# Patient Record
Sex: FEMALE | Race: WHITE | Employment: OTHER | URBAN - METROPOLITAN AREA
[De-identification: names, ages, dates, MRNs, and addresses within clinical notes are randomized per-mention and may not be internally consistent; named-entity substitution may affect disease eponyms.]

---

## 2017-01-01 ENCOUNTER — CARE COORDINATION (OUTPATIENT)
Dept: CARE COORDINATION | Age: 57
End: 2017-01-01

## 2017-01-01 ENCOUNTER — CARE COORDINATION (OUTPATIENT)
Dept: CASE MANAGEMENT | Age: 57
End: 2017-01-01

## 2017-01-01 ENCOUNTER — OFFICE VISIT (OUTPATIENT)
Dept: CARDIOTHORACIC SURGERY | Age: 57
End: 2017-01-01

## 2017-01-01 ENCOUNTER — HOSPITAL ENCOUNTER (OUTPATIENT)
Dept: PREADMISSION TESTING | Age: 57
Discharge: HOME | End: 2017-05-01
Attending: OBSTETRICS & GYNECOLOGY | Admitting: OBSTETRICS & GYNECOLOGY

## 2017-01-01 ENCOUNTER — OFFICE VISIT (OUTPATIENT)
Dept: CARDIOLOGY CLINIC | Age: 57
End: 2017-01-01

## 2017-01-01 ENCOUNTER — PAT TELEPHONE (OUTPATIENT)
Dept: PREADMISSION TESTING | Age: 57
End: 2017-01-01

## 2017-01-01 ENCOUNTER — HOSPITAL ENCOUNTER (OUTPATIENT)
Dept: ENDOSCOPY | Age: 57
Discharge: HOME | End: 2017-03-13
Attending: INTERNAL MEDICINE | Admitting: INTERNAL MEDICINE

## 2017-01-01 ENCOUNTER — TELEPHONE (OUTPATIENT)
Dept: PHARMACY | Facility: CLINIC | Age: 57
End: 2017-01-01

## 2017-01-01 ENCOUNTER — TELEPHONE (OUTPATIENT)
Dept: INFECTIOUS DISEASES | Age: 57
End: 2017-01-01

## 2017-01-01 ENCOUNTER — HOSPITAL ENCOUNTER (OUTPATIENT)
Dept: PREADMISSION TESTING | Age: 57
Discharge: HOME | End: 2017-10-18
Attending: THORACIC SURGERY (CARDIOTHORACIC VASCULAR SURGERY) | Admitting: THORACIC SURGERY (CARDIOTHORACIC VASCULAR SURGERY)

## 2017-01-01 ENCOUNTER — OFFICE VISIT (OUTPATIENT)
Dept: INFECTIOUS DISEASES | Age: 57
End: 2017-01-01

## 2017-01-01 ENCOUNTER — TELEPHONE (OUTPATIENT)
Dept: CARDIAC CATH/INVASIVE PROCEDURES | Age: 57
End: 2017-01-01

## 2017-01-01 ENCOUNTER — TELEPHONE (OUTPATIENT)
Dept: CARDIOLOGY CLINIC | Age: 57
End: 2017-01-01

## 2017-01-01 ENCOUNTER — TELEPHONE (OUTPATIENT)
Dept: CARDIOTHORACIC SURGERY | Age: 57
End: 2017-01-01

## 2017-01-01 ENCOUNTER — SURG/PROC ORDERS (OUTPATIENT)
Dept: GYNECOLOGIC ONCOLOGY | Age: 57
End: 2017-01-01

## 2017-01-01 ENCOUNTER — HOSPITAL ENCOUNTER (OUTPATIENT)
Dept: SURGERY | Age: 57
Discharge: HOME | End: 2017-05-04
Attending: OBSTETRICS & GYNECOLOGY | Admitting: OBSTETRICS & GYNECOLOGY

## 2017-01-01 ENCOUNTER — OFFICE VISIT (OUTPATIENT)
Dept: FAMILY MEDICINE CLINIC | Age: 57
End: 2017-01-01

## 2017-01-01 VITALS
BODY MASS INDEX: 19.57 KG/M2 | DIASTOLIC BLOOD PRESSURE: 78 MMHG | WEIGHT: 107 LBS | SYSTOLIC BLOOD PRESSURE: 130 MMHG | HEART RATE: 60 BPM

## 2017-01-01 VITALS
HEART RATE: 75 BPM | SYSTOLIC BLOOD PRESSURE: 122 MMHG | BODY MASS INDEX: 17.66 KG/M2 | HEIGHT: 62 IN | DIASTOLIC BLOOD PRESSURE: 65 MMHG | TEMPERATURE: 96.6 F | RESPIRATION RATE: 14 BRPM | OXYGEN SATURATION: 93 % | WEIGHT: 96 LBS

## 2017-01-01 VITALS
WEIGHT: 109 LBS | BODY MASS INDEX: 19.94 KG/M2 | SYSTOLIC BLOOD PRESSURE: 134 MMHG | DIASTOLIC BLOOD PRESSURE: 80 MMHG | TEMPERATURE: 98 F

## 2017-01-01 VITALS
TEMPERATURE: 97.9 F | BODY MASS INDEX: 20.12 KG/M2 | DIASTOLIC BLOOD PRESSURE: 70 MMHG | SYSTOLIC BLOOD PRESSURE: 126 MMHG | WEIGHT: 110 LBS

## 2017-01-01 VITALS
WEIGHT: 101 LBS | DIASTOLIC BLOOD PRESSURE: 60 MMHG | SYSTOLIC BLOOD PRESSURE: 108 MMHG | TEMPERATURE: 98.2 F | BODY MASS INDEX: 18.47 KG/M2

## 2017-01-01 VITALS
HEIGHT: 62 IN | SYSTOLIC BLOOD PRESSURE: 128 MMHG | TEMPERATURE: 97.8 F | BODY MASS INDEX: 19.88 KG/M2 | SYSTOLIC BLOOD PRESSURE: 136 MMHG | DIASTOLIC BLOOD PRESSURE: 60 MMHG | TEMPERATURE: 98 F | WEIGHT: 108 LBS | BODY MASS INDEX: 20.78 KG/M2 | WEIGHT: 110 LBS | DIASTOLIC BLOOD PRESSURE: 78 MMHG

## 2017-01-01 VITALS
DIASTOLIC BLOOD PRESSURE: 62 MMHG | SYSTOLIC BLOOD PRESSURE: 118 MMHG | WEIGHT: 104 LBS | BODY MASS INDEX: 19.14 KG/M2 | OXYGEN SATURATION: 93 % | TEMPERATURE: 98.3 F | HEART RATE: 95 BPM | HEIGHT: 62 IN

## 2017-01-01 VITALS
WEIGHT: 99 LBS | HEART RATE: 81 BPM | BODY MASS INDEX: 18.22 KG/M2 | HEIGHT: 62 IN | OXYGEN SATURATION: 92 % | SYSTOLIC BLOOD PRESSURE: 128 MMHG | DIASTOLIC BLOOD PRESSURE: 76 MMHG

## 2017-01-01 VITALS
DIASTOLIC BLOOD PRESSURE: 60 MMHG | OXYGEN SATURATION: 94 % | RESPIRATION RATE: 16 BRPM | HEART RATE: 75 BPM | TEMPERATURE: 97.7 F | BODY MASS INDEX: 20.06 KG/M2 | HEIGHT: 62 IN | WEIGHT: 109 LBS | SYSTOLIC BLOOD PRESSURE: 127 MMHG

## 2017-01-01 VITALS
SYSTOLIC BLOOD PRESSURE: 110 MMHG | BODY MASS INDEX: 21.5 KG/M2 | WEIGHT: 113.8 LBS | DIASTOLIC BLOOD PRESSURE: 60 MMHG | HEART RATE: 72 BPM

## 2017-01-01 VITALS
WEIGHT: 98.4 LBS | SYSTOLIC BLOOD PRESSURE: 104 MMHG | BODY MASS INDEX: 18 KG/M2 | HEART RATE: 80 BPM | DIASTOLIC BLOOD PRESSURE: 56 MMHG

## 2017-01-01 VITALS
SYSTOLIC BLOOD PRESSURE: 137 MMHG | BODY MASS INDEX: 20.14 KG/M2 | TEMPERATURE: 96.9 F | OXYGEN SATURATION: 94 % | HEART RATE: 75 BPM | WEIGHT: 109.46 LBS | DIASTOLIC BLOOD PRESSURE: 82 MMHG | HEIGHT: 62 IN | RESPIRATION RATE: 16 BRPM

## 2017-01-01 VITALS
HEART RATE: 76 BPM | WEIGHT: 107 LBS | SYSTOLIC BLOOD PRESSURE: 118 MMHG | DIASTOLIC BLOOD PRESSURE: 68 MMHG | RESPIRATION RATE: 16 BRPM | BODY MASS INDEX: 19.69 KG/M2 | HEIGHT: 62 IN

## 2017-01-01 VITALS — HEIGHT: 62 IN | BODY MASS INDEX: 21.53 KG/M2 | WEIGHT: 117 LBS

## 2017-01-01 VITALS
HEART RATE: 76 BPM | OXYGEN SATURATION: 90 % | BODY MASS INDEX: 19.98 KG/M2 | HEIGHT: 62 IN | TEMPERATURE: 98 F | RESPIRATION RATE: 18 BRPM | DIASTOLIC BLOOD PRESSURE: 68 MMHG | SYSTOLIC BLOOD PRESSURE: 112 MMHG | WEIGHT: 108.6 LBS

## 2017-01-01 VITALS
SYSTOLIC BLOOD PRESSURE: 142 MMHG | HEART RATE: 87 BPM | BODY MASS INDEX: 19.57 KG/M2 | OXYGEN SATURATION: 91 % | DIASTOLIC BLOOD PRESSURE: 72 MMHG | WEIGHT: 107 LBS | TEMPERATURE: 97.7 F

## 2017-01-01 DIAGNOSIS — R68.89 OTHER GENERAL SYMPTOMS AND SIGNS: ICD-10-CM

## 2017-01-01 DIAGNOSIS — Z01.818 PREOP TESTING: ICD-10-CM

## 2017-01-01 DIAGNOSIS — B20 HUMAN IMMUNODEFICIENCY VIRUS (HIV) DISEASE (HCC): Primary | ICD-10-CM

## 2017-01-01 DIAGNOSIS — Z00.00 MEDICARE ANNUAL WELLNESS VISIT, SUBSEQUENT: Primary | ICD-10-CM

## 2017-01-01 DIAGNOSIS — I47.1 SVT (SUPRAVENTRICULAR TACHYCARDIA) (HCC): ICD-10-CM

## 2017-01-01 DIAGNOSIS — Q24.9 COMPLEX CONGENITAL HEART DEFECT: ICD-10-CM

## 2017-01-01 DIAGNOSIS — I25.10 CAD S/P PERCUTANEOUS CORONARY ANGIOPLASTY: ICD-10-CM

## 2017-01-01 DIAGNOSIS — I10 ESSENTIAL HYPERTENSION, BENIGN: Primary | ICD-10-CM

## 2017-01-01 DIAGNOSIS — R68.3 CLUBBING OF FINGERS: ICD-10-CM

## 2017-01-01 DIAGNOSIS — E46 MALNUTRITION (HCC): ICD-10-CM

## 2017-01-01 DIAGNOSIS — I10 ESSENTIAL HYPERTENSION, BENIGN: ICD-10-CM

## 2017-01-01 DIAGNOSIS — I47.1 SVT (SUPRAVENTRICULAR TACHYCARDIA) (HCC): Primary | ICD-10-CM

## 2017-01-01 DIAGNOSIS — N18.30 CHRONIC KIDNEY DISEASE (CKD), STAGE III (MODERATE) (HCC): ICD-10-CM

## 2017-01-01 DIAGNOSIS — N18.9 CKD (CHRONIC KIDNEY DISEASE), UNSPECIFIED STAGE: ICD-10-CM

## 2017-01-01 DIAGNOSIS — Q20.3 CONGENITALLY CORRECTED TRANSPOSITION OF THE GREAT ARTERIES WITH VENTRICULAR SEPTAL DEFECT: Primary | ICD-10-CM

## 2017-01-01 DIAGNOSIS — I50.42 CHF (CONGESTIVE HEART FAILURE), NYHA CLASS II, CHRONIC, COMBINED (HCC): ICD-10-CM

## 2017-01-01 DIAGNOSIS — B20 HUMAN IMMUNODEFICIENCY VIRUS (HIV) DISEASE (HCC): ICD-10-CM

## 2017-01-01 DIAGNOSIS — E55.9 VITAMIN D DEFICIENCY: ICD-10-CM

## 2017-01-01 DIAGNOSIS — D07.1 VIN III (VULVAR INTRAEPITHELIAL NEOPLASIA III): ICD-10-CM

## 2017-01-01 DIAGNOSIS — I25.10 CORONARY ARTERY DISEASE INVOLVING NATIVE CORONARY ARTERY OF NATIVE HEART WITHOUT ANGINA PECTORIS: ICD-10-CM

## 2017-01-01 DIAGNOSIS — R14.0 ABDOMINAL DISTENSION (GASEOUS): ICD-10-CM

## 2017-01-01 DIAGNOSIS — Q21.0 VENTRICULAR SEPTAL DEFECT: ICD-10-CM

## 2017-01-01 DIAGNOSIS — Q20.3 TRANSPOSITION OF THE GREAT VESSELS WITH INTACT VENTRICULAR SEPTUM AND LEFT VENTRICULAR OUTFLOW TRACT OBSTRUCT: ICD-10-CM

## 2017-01-01 DIAGNOSIS — Q20.3 CORRECTED TRANSPOSITION OF GREAT ARTERIES WITH VSD: Primary | ICD-10-CM

## 2017-01-01 DIAGNOSIS — I36.1 NON-RHEUMATIC TRICUSPID VALVE INSUFFICIENCY: ICD-10-CM

## 2017-01-01 DIAGNOSIS — Z98.61 CAD S/P PERCUTANEOUS CORONARY ANGIOPLASTY: ICD-10-CM

## 2017-01-01 DIAGNOSIS — R18.8 OTHER ASCITES: ICD-10-CM

## 2017-01-01 DIAGNOSIS — Q24.3: ICD-10-CM

## 2017-01-01 DIAGNOSIS — Z01.818 PREOP TESTING: Primary | ICD-10-CM

## 2017-01-01 DIAGNOSIS — N87.1 MODERATE DYSPLASIA OF CERVIX: ICD-10-CM

## 2017-01-01 DIAGNOSIS — E78.00 PURE HYPERCHOLESTEROLEMIA: ICD-10-CM

## 2017-01-01 DIAGNOSIS — I27.20 PULMONARY HYPERTENSION (HCC): ICD-10-CM

## 2017-01-01 DIAGNOSIS — Q20.3 TRANSPOSITION OF THE GREAT VESSELS WITH INTACT VENTRICULAR SEPTUM AND LEFT VENTRICULAR OUTFLOW TRACT OBSTRUCT: Primary | ICD-10-CM

## 2017-01-01 LAB
A/G RATIO: 1.3 (ref 1.1–2.2)
A/G RATIO: 1.5 (ref 1.1–2.2)
A/G RATIO: 1.5 (ref 1.1–2.2)
A/G RATIO: 1.6 (ref 1.1–2.2)
A/G RATIO: 1.9 (ref 1.1–2.2)
ABO/RH: NORMAL
ALBUMIN SERPL-MCNC: 4 G/DL (ref 3.4–5)
ALBUMIN SERPL-MCNC: 4.3 G/DL (ref 3.4–5)
ALBUMIN SERPL-MCNC: 4.4 G/DL (ref 3.4–5)
ALP BLD-CCNC: 110 U/L (ref 40–129)
ALP BLD-CCNC: 84 U/L (ref 40–129)
ALP BLD-CCNC: 94 U/L (ref 40–129)
ALP BLD-CCNC: 95 U/L (ref 40–129)
ALP BLD-CCNC: 99 U/L (ref 40–129)
ALT SERPL-CCNC: 11 U/L (ref 10–40)
ALT SERPL-CCNC: 13 U/L (ref 10–40)
ALT SERPL-CCNC: 14 U/L (ref 10–40)
ANION GAP SERPL CALCULATED.3IONS-SCNC: 12 MMOL/L (ref 3–16)
ANION GAP SERPL CALCULATED.3IONS-SCNC: 14 MMOL/L (ref 3–16)
ANION GAP SERPL CALCULATED.3IONS-SCNC: 15 MMOL/L (ref 3–16)
ANION GAP SERPL CALCULATED.3IONS-SCNC: 16 MMOL/L (ref 3–16)
ANION GAP SERPL CALCULATED.3IONS-SCNC: 17 MMOL/L (ref 3–16)
ANION GAP SERPL CALCULATED.3IONS-SCNC: 19 MMOL/L (ref 3–16)
ANTIBODY SCREEN: NORMAL
ANTIBODY SCREEN: NORMAL
APTT: 26.6 SEC (ref 24.1–34.9)
APTT: 30.3 SEC (ref 21–31.8)
AST SERPL-CCNC: 16 U/L (ref 15–37)
AST SERPL-CCNC: 18 U/L (ref 15–37)
AST SERPL-CCNC: 20 U/L (ref 15–37)
AST SERPL-CCNC: 20 U/L (ref 15–37)
AST SERPL-CCNC: 26 U/L (ref 15–37)
BACTERIA: ABNORMAL /HPF
BACTERIA: ABNORMAL /HPF
BASOPHILS ABSOLUTE: 0 K/UL (ref 0–0.2)
BASOPHILS ABSOLUTE: 0.1 K/UL (ref 0–0.2)
BASOPHILS RELATIVE PERCENT: 0.6 %
BASOPHILS RELATIVE PERCENT: 0.8 %
BASOPHILS RELATIVE PERCENT: 1 %
BASOPHILS RELATIVE PERCENT: 1.1 %
BILIRUB SERPL-MCNC: 0.3 MG/DL (ref 0–1)
BILIRUB SERPL-MCNC: <0.2 MG/DL (ref 0–1)
BILIRUBIN URINE: NEGATIVE
BILIRUBIN URINE: NEGATIVE
BLOOD, URINE: ABNORMAL
BLOOD, URINE: NEGATIVE
BUN BLDV-MCNC: 15 MG/DL (ref 7–20)
BUN BLDV-MCNC: 19 MG/DL (ref 7–20)
BUN BLDV-MCNC: 20 MG/DL (ref 7–20)
BUN BLDV-MCNC: 21 MG/DL (ref 7–20)
CALCIUM SERPL-MCNC: 9 MG/DL (ref 8.3–10.6)
CALCIUM SERPL-MCNC: 9.1 MG/DL (ref 8.3–10.6)
CALCIUM SERPL-MCNC: 9.3 MG/DL (ref 8.3–10.6)
CALCIUM SERPL-MCNC: 9.3 MG/DL (ref 8.3–10.6)
CALCIUM SERPL-MCNC: 9.4 MG/DL (ref 8.3–10.6)
CALCIUM SERPL-MCNC: 9.7 MG/DL (ref 8.3–10.6)
CASTS 2: ABNORMAL /LPF
CASTS: ABNORMAL /LPF
CHLORIDE BLD-SCNC: 100 MMOL/L (ref 99–110)
CHLORIDE BLD-SCNC: 100 MMOL/L (ref 99–110)
CHLORIDE BLD-SCNC: 102 MMOL/L (ref 99–110)
CHLORIDE BLD-SCNC: 103 MMOL/L (ref 99–110)
CHLORIDE BLD-SCNC: 104 MMOL/L (ref 99–110)
CHLORIDE BLD-SCNC: 99 MMOL/L (ref 99–110)
CHOLESTEROL, FASTING: 179 MG/DL (ref 0–199)
CHOLESTEROL, TOTAL: 248 MG/DL (ref 0–199)
CLARITY: CLEAR
CLARITY: CLEAR
CO2: 21 MMOL/L (ref 21–32)
CO2: 21 MMOL/L (ref 21–32)
CO2: 23 MMOL/L (ref 21–32)
COLOR: YELLOW
COLOR: YELLOW
CREAT SERPL-MCNC: 1.3 MG/DL (ref 0.6–1.1)
CREAT SERPL-MCNC: 1.4 MG/DL (ref 0.6–1.1)
CREAT SERPL-MCNC: 1.5 MG/DL (ref 0.6–1.1)
CREAT SERPL-MCNC: 1.6 MG/DL (ref 0.6–1.1)
EKG ATRIAL RATE: 69 BPM
EKG ATRIAL RATE: 71 BPM
EKG DIAGNOSIS: NORMAL
EKG DIAGNOSIS: NORMAL
EKG P AXIS: 26 DEGREES
EKG P AXIS: 55 DEGREES
EKG P-R INTERVAL: 150 MS
EKG P-R INTERVAL: 178 MS
EKG Q-T INTERVAL: 426 MS
EKG Q-T INTERVAL: 430 MS
EKG QRS DURATION: 74 MS
EKG QRS DURATION: 78 MS
EKG QTC CALCULATION (BAZETT): 460 MS
EKG QTC CALCULATION (BAZETT): 462 MS
EKG R AXIS: 80 DEGREES
EKG R AXIS: 82 DEGREES
EKG T AXIS: 155 DEGREES
EKG T AXIS: 207 DEGREES
EKG VENTRICULAR RATE: 69 BPM
EKG VENTRICULAR RATE: 71 BPM
EOSINOPHILS ABSOLUTE: 0.2 K/UL (ref 0–0.6)
EOSINOPHILS ABSOLUTE: 0.2 K/UL (ref 0–0.6)
EOSINOPHILS ABSOLUTE: 0.4 K/UL (ref 0–0.6)
EOSINOPHILS ABSOLUTE: 0.4 K/UL (ref 0–0.6)
EOSINOPHILS RELATIVE PERCENT: 4.3 %
EOSINOPHILS RELATIVE PERCENT: 5.8 %
EOSINOPHILS RELATIVE PERCENT: 6.2 %
EOSINOPHILS RELATIVE PERCENT: 6.7 %
EPITHELIAL CELLS, UA: 1 /HPF (ref 0–5)
EPITHELIAL CELLS, UA: ABNORMAL /HPF
ESTIMATED AVERAGE GLUCOSE: 114 MG/DL
FIBRINOGEN: 534 MG/DL (ref 200–397)
GFR AFRICAN AMERICAN: 40
GFR AFRICAN AMERICAN: 43
GFR AFRICAN AMERICAN: 47
GFR AFRICAN AMERICAN: 51
GFR NON-AFRICAN AMERICAN: 33
GFR NON-AFRICAN AMERICAN: 36
GFR NON-AFRICAN AMERICAN: 39
GFR NON-AFRICAN AMERICAN: 42
GLOBULIN: 2.3 G/DL
GLOBULIN: 2.7 G/DL
GLOBULIN: 2.7 G/DL
GLOBULIN: 2.8 G/DL
GLOBULIN: 3 G/DL
GLUCOSE BLD-MCNC: 101 MG/DL (ref 70–99)
GLUCOSE BLD-MCNC: 103 MG/DL (ref 70–99)
GLUCOSE BLD-MCNC: 108 MG/DL (ref 70–99)
GLUCOSE BLD-MCNC: 119 MG/DL (ref 70–99)
GLUCOSE BLD-MCNC: 120 MG/DL (ref 70–99)
GLUCOSE BLD-MCNC: 95 MG/DL (ref 70–99)
GLUCOSE URINE: NEGATIVE MG/DL
GLUCOSE URINE: NEGATIVE MG/DL
HBA1C MFR BLD: 5.6 %
HCT VFR BLD CALC: 37.3 % (ref 36–48)
HCT VFR BLD CALC: 37.7 % (ref 36–48)
HCT VFR BLD CALC: 37.7 % (ref 36–48)
HCT VFR BLD CALC: 39.3 % (ref 36–48)
HCT VFR BLD CALC: 42 % (ref 36–48)
HCT VFR BLD CALC: 43.6 % (ref 36–48)
HDLC SERPL-MCNC: 31 MG/DL (ref 40–60)
HDLC SERPL-MCNC: 42 MG/DL (ref 40–60)
HEMATOLOGY PATH CONSULT: NO
HEMOGLOBIN: 12.6 G/DL (ref 12–16)
HEMOGLOBIN: 12.6 G/DL (ref 12–16)
HEMOGLOBIN: 12.7 G/DL (ref 12–16)
HEMOGLOBIN: 13.3 G/DL (ref 12–16)
HEMOGLOBIN: 13.8 G/DL (ref 12–16)
HEMOGLOBIN: 14 G/DL (ref 12–16)
HIV RNA PCR INTERPRETATION: DETECTED
HIV RNA PCR INTERPRETATION: NOT DETECTED
HIV-1 RNA BY PCR, QN: 1.9 LOG
HIV-1 RNA BY PCR, QN: 81 CPY/ML
HIV-1 RNA BY PCR, QN: <1.3 LOG
HIV-1 RNA BY PCR, QN: <20 CPY/ML
HYALINE CASTS: 0 /LPF (ref 0–8)
INR BLD: 0.97 (ref 0.85–1.15)
INR BLD: 0.98 (ref 0.85–1.15)
KETONES, URINE: NEGATIVE MG/DL
KETONES, URINE: NEGATIVE MG/DL
LDL CHOLESTEROL CALCULATED: ABNORMAL MG/DL
LDL CHOLESTEROL CALCULATED: ABNORMAL MG/DL
LDL CHOLESTEROL DIRECT: 112 MG/DL
LDL CHOLESTEROL DIRECT: 95 MG/DL
LEUKOCYTE ESTERASE, URINE: ABNORMAL
LEUKOCYTE ESTERASE, URINE: NEGATIVE
LYMPHOCYTES ABSOLUTE: 0.9 K/UL (ref 1–5.1)
LYMPHOCYTES ABSOLUTE: 1 K/UL (ref 1–5.1)
LYMPHOCYTES ABSOLUTE: 1 K/UL (ref 1–5.1)
LYMPHOCYTES ABSOLUTE: 1.2 K/UL (ref 1–5.1)
LYMPHOCYTES RELATIVE PERCENT: 17.8 %
LYMPHOCYTES RELATIVE PERCENT: 18.9 %
LYMPHOCYTES RELATIVE PERCENT: 20.2 %
LYMPHOCYTES RELATIVE PERCENT: 24.7 %
MAGNESIUM: 2.2 MG/DL (ref 1.8–2.4)
MCH RBC QN AUTO: 37.4 PG (ref 26–34)
MCH RBC QN AUTO: 40.2 PG (ref 26–34)
MCH RBC QN AUTO: 40.5 PG (ref 26–34)
MCH RBC QN AUTO: 40.6 PG (ref 26–34)
MCH RBC QN AUTO: 41.5 PG (ref 26–34)
MCH RBC QN AUTO: 41.8 PG (ref 26–34)
MCHC RBC AUTO-ENTMCNC: 32.2 G/DL (ref 31–36)
MCHC RBC AUTO-ENTMCNC: 32.8 G/DL (ref 31–36)
MCHC RBC AUTO-ENTMCNC: 33.6 G/DL (ref 31–36)
MCHC RBC AUTO-ENTMCNC: 33.7 G/DL (ref 31–36)
MCHC RBC AUTO-ENTMCNC: 33.7 G/DL (ref 31–36)
MCHC RBC AUTO-ENTMCNC: 33.9 G/DL (ref 31–36)
MCV RBC AUTO: 116.2 FL (ref 80–100)
MCV RBC AUTO: 119.6 FL (ref 80–100)
MCV RBC AUTO: 120.5 FL (ref 80–100)
MCV RBC AUTO: 122.4 FL (ref 80–100)
MCV RBC AUTO: 123 FL (ref 80–100)
MCV RBC AUTO: 124.6 FL (ref 80–100)
MICROSCOPIC EXAMINATION: YES
MICROSCOPIC EXAMINATION: YES
MONOCYTES ABSOLUTE: 0.2 K/UL (ref 0–1.3)
MONOCYTES ABSOLUTE: 0.3 K/UL (ref 0–1.3)
MONOCYTES ABSOLUTE: 0.3 K/UL (ref 0–1.3)
MONOCYTES ABSOLUTE: 0.4 K/UL (ref 0–1.3)
MONOCYTES RELATIVE PERCENT: 5.7 %
MONOCYTES RELATIVE PERCENT: 5.8 %
MONOCYTES RELATIVE PERCENT: 6.6 %
MONOCYTES RELATIVE PERCENT: 7.3 %
MRSA SCREEN RT-PCR: NORMAL
NEUTROPHILS ABSOLUTE: 2.2 K/UL (ref 1.7–7.7)
NEUTROPHILS ABSOLUTE: 3.8 K/UL (ref 1.7–7.7)
NEUTROPHILS ABSOLUTE: 3.9 K/UL (ref 1.7–7.7)
NEUTROPHILS ABSOLUTE: 4.1 K/UL (ref 1.7–7.7)
NEUTROPHILS RELATIVE PERCENT: 61.4 %
NEUTROPHILS RELATIVE PERCENT: 67.7 %
NEUTROPHILS RELATIVE PERCENT: 67.8 %
NEUTROPHILS RELATIVE PERCENT: 69.6 %
NITRITE, URINE: NEGATIVE
NITRITE, URINE: NEGATIVE
ORGANISM: ABNORMAL
PDW BLD-RTO: 13 % (ref 12.4–15.4)
PDW BLD-RTO: 13.6 % (ref 12.4–15.4)
PDW BLD-RTO: 13.7 % (ref 12.4–15.4)
PDW BLD-RTO: 13.8 % (ref 12.4–15.4)
PDW BLD-RTO: 14.2 % (ref 12.4–15.4)
PDW BLD-RTO: 14.6 % (ref 12.4–15.4)
PH UA: 5
PH UA: 6
PLATELET # BLD: 131 K/UL (ref 135–450)
PLATELET # BLD: 134 K/UL (ref 135–450)
PLATELET # BLD: 144 K/UL (ref 135–450)
PLATELET # BLD: 146 K/UL (ref 135–450)
PLATELET # BLD: 147 K/UL (ref 135–450)
PLATELET # BLD: 154 K/UL (ref 135–450)
PLATELET SLIDE REVIEW: ABNORMAL
PMV BLD AUTO: 8.3 FL (ref 5–10.5)
PMV BLD AUTO: 8.6 FL (ref 5–10.5)
PMV BLD AUTO: 8.7 FL (ref 5–10.5)
PMV BLD AUTO: 8.8 FL (ref 5–10.5)
PMV BLD AUTO: 8.8 FL (ref 5–10.5)
PMV BLD AUTO: 9 FL (ref 5–10.5)
POTASSIUM SERPL-SCNC: 3.7 MMOL/L (ref 3.5–5.1)
POTASSIUM SERPL-SCNC: 4 MMOL/L (ref 3.5–5.1)
POTASSIUM SERPL-SCNC: 4.7 MMOL/L (ref 3.5–5.1)
POTASSIUM SERPL-SCNC: 4.9 MMOL/L (ref 3.5–5.1)
POTASSIUM SERPL-SCNC: 5 MMOL/L (ref 3.5–5.1)
POTASSIUM SERPL-SCNC: 5.3 MMOL/L (ref 3.5–5.1)
PROTEIN UA: 30 MG/DL
PROTEIN UA: NEGATIVE MG/DL
PROTHROMBIN TIME: 11 SEC (ref 9.6–13)
PROTHROMBIN TIME: 11.1 SEC (ref 9.6–13)
RBC # BLD: 3.02 M/UL (ref 4–5.2)
RBC # BLD: 3.03 M/UL (ref 4–5.2)
RBC # BLD: 3.13 M/UL (ref 4–5.2)
RBC # BLD: 3.29 M/UL (ref 4–5.2)
RBC # BLD: 3.43 M/UL (ref 4–5.2)
RBC # BLD: 3.75 M/UL (ref 4–5.2)
RBC UA: 0 /HPF (ref 0–4)
RBC UA: ABNORMAL /HPF (ref 0–2)
RENAL EPITHELIAL, UA: ABNORMAL /HPF
SLIDE REVIEW: ABNORMAL
SODIUM BLD-SCNC: 136 MMOL/L (ref 136–145)
SODIUM BLD-SCNC: 138 MMOL/L (ref 136–145)
SODIUM BLD-SCNC: 139 MMOL/L (ref 136–145)
SODIUM BLD-SCNC: 139 MMOL/L (ref 136–145)
SODIUM BLD-SCNC: 140 MMOL/L (ref 136–145)
SODIUM BLD-SCNC: 143 MMOL/L (ref 136–145)
SPECIFIC GRAVITY UA: 1.02
SPECIFIC GRAVITY UA: >=1.03
TOTAL PROTEIN: 6.6 G/DL (ref 6.4–8.2)
TOTAL PROTEIN: 6.7 G/DL (ref 6.4–8.2)
TOTAL PROTEIN: 6.7 G/DL (ref 6.4–8.2)
TOTAL PROTEIN: 7 G/DL (ref 6.4–8.2)
TOTAL PROTEIN: 7.2 G/DL (ref 6.4–8.2)
TRIGL SERPL-MCNC: 716 MG/DL (ref 0–150)
TRIGLYCERIDE, FASTING: 315 MG/DL (ref 0–150)
URINE CULTURE, ROUTINE: ABNORMAL
URINE CULTURE, ROUTINE: NORMAL
URINE TYPE: ABNORMAL
URINE TYPE: ABNORMAL
UROBILINOGEN, URINE: 0.2 E.U./DL
UROBILINOGEN, URINE: 0.2 E.U./DL
VITAMIN D 25-HYDROXY: 22.5 NG/ML
VLDLC SERPL CALC-MCNC: ABNORMAL MG/DL
VLDLC SERPL CALC-MCNC: ABNORMAL MG/DL
WBC # BLD: 3.6 K/UL (ref 4–11)
WBC # BLD: 4.1 K/UL (ref 4–11)
WBC # BLD: 5.6 K/UL (ref 4–11)
WBC # BLD: 5.6 K/UL (ref 4–11)
WBC # BLD: 6.1 K/UL (ref 4–11)
WBC # BLD: 8.2 K/UL (ref 4–11)
WBC UA: 6 /HPF (ref 0–5)
WBC UA: ABNORMAL /HPF (ref 0–5)

## 2017-01-01 PROCEDURE — 3017F COLORECTAL CA SCREEN DOC REV: CPT | Performed by: INTERNAL MEDICINE

## 2017-01-01 PROCEDURE — 3014F SCREEN MAMMO DOC REV: CPT | Performed by: INTERNAL MEDICINE

## 2017-01-01 PROCEDURE — G8427 DOCREV CUR MEDS BY ELIG CLIN: HCPCS | Performed by: INTERNAL MEDICINE

## 2017-01-01 PROCEDURE — G8484 FLU IMMUNIZE NO ADMIN: HCPCS | Performed by: INTERNAL MEDICINE

## 2017-01-01 PROCEDURE — G8420 CALC BMI NORM PARAMETERS: HCPCS | Performed by: INTERNAL MEDICINE

## 2017-01-01 PROCEDURE — G8598 ASA/ANTIPLAT THER USED: HCPCS | Performed by: INTERNAL MEDICINE

## 2017-01-01 PROCEDURE — 99214 OFFICE O/P EST MOD 30 MIN: CPT | Performed by: INTERNAL MEDICINE

## 2017-01-01 PROCEDURE — G8419 CALC BMI OUT NRM PARAM NOF/U: HCPCS | Performed by: INTERNAL MEDICINE

## 2017-01-01 PROCEDURE — 99999 PR OFFICE/OUTPT VISIT,PROCEDURE ONLY: CPT | Performed by: INTERNAL MEDICINE

## 2017-01-01 PROCEDURE — 99215 OFFICE O/P EST HI 40 MIN: CPT | Performed by: INTERNAL MEDICINE

## 2017-01-01 PROCEDURE — 1036F TOBACCO NON-USER: CPT | Performed by: INTERNAL MEDICINE

## 2017-01-01 PROCEDURE — 93000 ELECTROCARDIOGRAM COMPLETE: CPT | Performed by: INTERNAL MEDICINE

## 2017-01-01 PROCEDURE — 99204 OFFICE O/P NEW MOD 45 MIN: CPT | Performed by: THORACIC SURGERY (CARDIOTHORACIC VASCULAR SURGERY)

## 2017-01-01 PROCEDURE — 1111F DSCHRG MED/CURRENT MED MERGE: CPT | Performed by: INTERNAL MEDICINE

## 2017-01-01 PROCEDURE — 93010 ELECTROCARDIOGRAM REPORT: CPT | Performed by: INTERNAL MEDICINE

## 2017-01-01 PROCEDURE — 99213 OFFICE O/P EST LOW 20 MIN: CPT | Performed by: INTERNAL MEDICINE

## 2017-01-01 PROCEDURE — G0439 PPPS, SUBSEQ VISIT: HCPCS | Performed by: INTERNAL MEDICINE

## 2017-01-01 PROCEDURE — 99215 OFFICE O/P EST HI 40 MIN: CPT | Performed by: THORACIC SURGERY (CARDIOTHORACIC VASCULAR SURGERY)

## 2017-01-01 RX ORDER — MORPHINE SULFATE 2 MG/ML
1 INJECTION, SOLUTION INTRAMUSCULAR; INTRAVENOUS EVERY 5 MIN PRN
Status: DISCONTINUED | OUTPATIENT
Start: 2017-01-01 | End: 2017-01-01 | Stop reason: HOSPADM

## 2017-01-01 RX ORDER — RALTEGRAVIR 400 MG/1
TABLET, FILM COATED ORAL
Qty: 60 TABLET | Refills: 5 | Status: ON HOLD | OUTPATIENT
Start: 2017-01-01 | End: 2017-01-01 | Stop reason: HOSPADM

## 2017-01-01 RX ORDER — ZIDOVUDINE 300 MG/1
TABLET ORAL
Qty: 60 TABLET | Refills: 5 | Status: SHIPPED | OUTPATIENT
Start: 2017-01-01

## 2017-01-01 RX ORDER — LEVETIRACETAM 500 MG/1
TABLET ORAL
Qty: 60 TABLET | Refills: 5 | Status: SHIPPED | OUTPATIENT
Start: 2017-01-01 | End: 2017-01-01 | Stop reason: HOSPADM

## 2017-01-01 RX ORDER — SODIUM CHLORIDE 9 MG/ML
INJECTION, SOLUTION INTRAVENOUS CONTINUOUS
Status: DISCONTINUED | OUTPATIENT
Start: 2017-01-01 | End: 2017-01-01 | Stop reason: HOSPADM

## 2017-01-01 RX ORDER — OXYCODONE HYDROCHLORIDE AND ACETAMINOPHEN 5; 325 MG/1; MG/1
2 TABLET ORAL PRN
Status: COMPLETED | OUTPATIENT
Start: 2017-01-01 | End: 2017-01-01

## 2017-01-01 RX ORDER — ONDANSETRON 2 MG/ML
4 INJECTION INTRAMUSCULAR; INTRAVENOUS
Status: ACTIVE | OUTPATIENT
Start: 2017-01-01 | End: 2017-01-01

## 2017-01-01 RX ORDER — FENTANYL CITRATE 50 UG/ML
25 INJECTION, SOLUTION INTRAMUSCULAR; INTRAVENOUS EVERY 5 MIN PRN
Status: DISCONTINUED | OUTPATIENT
Start: 2017-01-01 | End: 2017-01-01 | Stop reason: HOSPADM

## 2017-01-01 RX ORDER — MEPERIDINE HYDROCHLORIDE 25 MG/ML
12.5 INJECTION INTRAMUSCULAR; INTRAVENOUS; SUBCUTANEOUS EVERY 5 MIN PRN
Status: DISCONTINUED | OUTPATIENT
Start: 2017-01-01 | End: 2017-01-01 | Stop reason: HOSPADM

## 2017-01-01 RX ORDER — ROSUVASTATIN CALCIUM 10 MG/1
TABLET, COATED ORAL
Qty: 30 TABLET | Refills: 5 | Status: SHIPPED | OUTPATIENT
Start: 2017-01-01

## 2017-01-01 RX ORDER — MIRTAZAPINE 15 MG/1
TABLET, FILM COATED ORAL
Qty: 30 TABLET | Refills: 5 | Status: SHIPPED | OUTPATIENT
Start: 2017-01-01 | End: 2017-01-01 | Stop reason: SDUPTHER

## 2017-01-01 RX ORDER — SODIUM CHLORIDE 0.9 % (FLUSH) 0.9 %
10 SYRINGE (ML) INJECTION PRN
Status: DISCONTINUED | OUTPATIENT
Start: 2017-01-01 | End: 2017-01-01 | Stop reason: HOSPADM

## 2017-01-01 RX ORDER — ZIDOVUDINE 300 MG/1
TABLET ORAL
Qty: 60 TABLET | Refills: 5 | Status: SHIPPED | OUTPATIENT
Start: 2017-01-01 | End: 2017-01-01 | Stop reason: SDUPTHER

## 2017-01-01 RX ORDER — OXYCODONE HYDROCHLORIDE AND ACETAMINOPHEN 5; 325 MG/1; MG/1
1 TABLET ORAL EVERY 6 HOURS PRN
Qty: 20 TABLET | Refills: 0 | Status: SHIPPED | OUTPATIENT
Start: 2017-01-01 | End: 2017-01-01

## 2017-01-01 RX ORDER — SULFAMETHOXAZOLE AND TRIMETHOPRIM 400; 80 MG/1; MG/1
TABLET ORAL
Qty: 30 TABLET | Refills: 5 | Status: SHIPPED | OUTPATIENT
Start: 2017-01-01 | End: 2017-01-01 | Stop reason: SDUPTHER

## 2017-01-01 RX ORDER — DOLUTEGRAVIR SODIUM 50 MG/1
TABLET, FILM COATED ORAL
Qty: 30 TABLET | Refills: 5 | Status: SHIPPED | OUTPATIENT
Start: 2017-01-01

## 2017-01-01 RX ORDER — SODIUM CHLORIDE 0.9 % (FLUSH) 0.9 %
10 SYRINGE (ML) INJECTION EVERY 12 HOURS SCHEDULED
Status: DISCONTINUED | OUTPATIENT
Start: 2017-01-01 | End: 2017-01-01 | Stop reason: HOSPADM

## 2017-01-01 RX ORDER — MIRTAZAPINE 30 MG/1
TABLET, FILM COATED ORAL
Qty: 30 TABLET | Refills: 5 | Status: SHIPPED | OUTPATIENT
Start: 2017-01-01

## 2017-01-01 RX ORDER — ROSUVASTATIN CALCIUM 10 MG/1
TABLET, COATED ORAL
Qty: 30 TABLET | Refills: 3 | Status: SHIPPED | OUTPATIENT
Start: 2017-01-01 | End: 2017-01-01 | Stop reason: ALTCHOICE

## 2017-01-01 RX ORDER — DOLUTEGRAVIR SODIUM 50 MG/1
TABLET, FILM COATED ORAL
Qty: 30 TABLET | Refills: 5 | Status: SHIPPED | OUTPATIENT
Start: 2017-01-01 | End: 2017-01-01 | Stop reason: SDUPTHER

## 2017-01-01 RX ORDER — SODIUM CHLORIDE 0.9 % (FLUSH) 0.9 %
10 SYRINGE (ML) INJECTION PRN
Status: CANCELLED | OUTPATIENT
Start: 2017-01-01

## 2017-01-01 RX ORDER — RITONAVIR 100 MG/1
100 TABLET ORAL 2 TIMES DAILY
Qty: 60 EACH | Refills: 5 | Status: SHIPPED | OUTPATIENT
Start: 2017-01-01 | End: 2017-01-01

## 2017-01-01 RX ORDER — CHLORHEXIDINE GLUCONATE 0.12 MG/ML
15 RINSE ORAL 2 TIMES DAILY
Status: CANCELLED | OUTPATIENT
Start: 2017-01-01

## 2017-01-01 RX ORDER — LIDOCAINE 50 MG/G
OINTMENT TOPICAL
Qty: 30 G | Refills: 1 | Status: SHIPPED | OUTPATIENT
Start: 2017-01-01 | End: 2017-01-01

## 2017-01-01 RX ORDER — MORPHINE SULFATE 2 MG/ML
2 INJECTION, SOLUTION INTRAMUSCULAR; INTRAVENOUS EVERY 5 MIN PRN
Status: DISCONTINUED | OUTPATIENT
Start: 2017-01-01 | End: 2017-01-01 | Stop reason: HOSPADM

## 2017-01-01 RX ORDER — FLUCONAZOLE 100 MG/1
100 TABLET ORAL DAILY
Qty: 30 TABLET | Refills: 1 | Status: SHIPPED | OUTPATIENT
Start: 2017-01-01 | End: 2017-01-01 | Stop reason: ALTCHOICE

## 2017-01-01 RX ORDER — CLOPIDOGREL BISULFATE 75 MG/1
75 TABLET ORAL DAILY
Qty: 30 TABLET | Refills: 11 | Status: SHIPPED | OUTPATIENT
Start: 2017-01-01

## 2017-01-01 RX ORDER — OXYCODONE HYDROCHLORIDE AND ACETAMINOPHEN 5; 325 MG/1; MG/1
1 TABLET ORAL PRN
Status: COMPLETED | OUTPATIENT
Start: 2017-01-01 | End: 2017-01-01

## 2017-01-01 RX ORDER — SODIUM CHLORIDE 0.9 % (FLUSH) 0.9 %
10 SYRINGE (ML) INJECTION EVERY 12 HOURS SCHEDULED
Status: CANCELLED | OUTPATIENT
Start: 2017-01-01

## 2017-01-01 RX ORDER — PROMETHAZINE HYDROCHLORIDE 12.5 MG/1
12.5 TABLET ORAL EVERY 6 HOURS PRN
Qty: 30 TABLET | Refills: 5 | Status: SHIPPED | OUTPATIENT
Start: 2017-01-01 | End: 2017-01-01

## 2017-01-01 RX ORDER — PANTOPRAZOLE SODIUM 40 MG/1
40 TABLET, DELAYED RELEASE ORAL DAILY
COMMUNITY

## 2017-01-01 RX ORDER — FENTANYL CITRATE 50 UG/ML
50 INJECTION, SOLUTION INTRAMUSCULAR; INTRAVENOUS EVERY 5 MIN PRN
Status: DISCONTINUED | OUTPATIENT
Start: 2017-01-01 | End: 2017-01-01 | Stop reason: HOSPADM

## 2017-01-01 RX ORDER — SULFAMETHOXAZOLE AND TRIMETHOPRIM 400; 80 MG/1; MG/1
TABLET ORAL
Qty: 30 TABLET | Refills: 5 | Status: SHIPPED | OUTPATIENT
Start: 2017-01-01

## 2017-01-01 RX ORDER — SODIUM CHLORIDE 9 MG/ML
INJECTION, SOLUTION INTRAVENOUS CONTINUOUS
Status: CANCELLED | OUTPATIENT
Start: 2017-01-01

## 2017-01-01 RX ADMIN — OXYCODONE HYDROCHLORIDE AND ACETAMINOPHEN 1 TABLET: 5; 325 TABLET ORAL at 16:36

## 2017-01-01 ASSESSMENT — PAIN SCALES - GENERAL
PAINLEVEL_OUTOF10: 6
PAINLEVEL_OUTOF10: 0
PAINLEVEL_OUTOF10: 6

## 2017-01-01 ASSESSMENT — ENCOUNTER SYMPTOMS
SHORTNESS OF BREATH: 1
RESPIRATORY NEGATIVE: 1
ABDOMINAL PAIN: 0
GASTROINTESTINAL NEGATIVE: 1
BACK PAIN: 0
ALLERGIC/IMMUNOLOGIC NEGATIVE: 1
EYE PAIN: 0
CHEST TIGHTNESS: 1
EYE REDNESS: 0
NAUSEA: 0
EYES NEGATIVE: 1
COLOR CHANGE: 0
SORE THROAT: 0
VOMITING: 0
SINUS PRESSURE: 0
WHEEZING: 0
DIARRHEA: 0
CONSTIPATION: 0

## 2017-01-01 ASSESSMENT — PATIENT HEALTH QUESTIONNAIRE - PHQ9
SUM OF ALL RESPONSES TO PHQ9 QUESTIONS 1 & 2: 0
2. FEELING DOWN, DEPRESSED OR HOPELESS: 0
SUM OF ALL RESPONSES TO PHQ QUESTIONS 1-9: 0
1. LITTLE INTEREST OR PLEASURE IN DOING THINGS: 0

## 2017-01-01 ASSESSMENT — PAIN DESCRIPTION - LOCATION: LOCATION: PERINEUM

## 2017-01-01 ASSESSMENT — PAIN - FUNCTIONAL ASSESSMENT
PAIN_FUNCTIONAL_ASSESSMENT: 0-10

## 2017-01-01 ASSESSMENT — PAIN DESCRIPTION - PAIN TYPE: TYPE: SURGICAL PAIN

## 2017-01-09 PROBLEM — Q21.0 ABSENCE OF INTERVENTRICULAR SEPTUM: Status: ACTIVE | Noted: 2017-01-01

## 2017-01-09 PROBLEM — D07.1 VIN III (VULVAR INTRAEPITHELIAL NEOPLASIA III): Status: ACTIVE | Noted: 2017-01-01

## 2017-01-09 PROBLEM — N18.30 CHRONIC KIDNEY DISEASE (CKD), STAGE III (MODERATE) (HCC): Status: ACTIVE | Noted: 2017-01-01

## 2017-01-09 PROBLEM — N87.1 MODERATE DYSPLASIA OF CERVIX: Status: ACTIVE | Noted: 2017-01-01

## 2017-07-04 PROBLEM — Z98.890 S/P CORONARY ANGIOGRAM: Status: ACTIVE | Noted: 2017-01-01

## 2017-10-04 NOTE — MR AVS SNAPSHOT
After Visit Summary             Samantha Stacy   10/4/2017 11:00 AM   Office Visit    Description:  Female : 1960   Provider:  Lawton Claude, MD   Department:  [facility] [place] Infectious Disease              Your Follow-Up and Future Appointments         Below is a list of your follow-up and future appointments. This may not be a complete list as you may have made appointments directly with providers that we are not aware of or your providers may have made some for you. Please call your providers to confirm appointments. It is important to keep your appointments. Please bring your current insurance card, photo ID, co-pay, and all medication bottles to your appointment. If self-pay, payment is expected at the time of service. Your To-Do List     Future Appointments Provider Department Dept Phone    10/26/2017 7:00 AM Petrona Gomes MD; OR ROOM 15 [facility] General Surgery [phone]    2017 1:00 PM Adriane Page [facility] [phone]    2017 10:40 AM Lawton Claude, MD [address] [place] [address] Infectious Disease [phone]    Please arrive 15 minutes prior to appointment time, bring insurance card and photo ID.     2017 2:00 PM Ori Resendiz MD [place] [address] [phone]    Please arrive 15 minutes prior to appointment, bring photo ID and insurance card. 2017 2:30 PM [facility], [address] [place] [address] [phone]    Please arrive 15 minutes prior to appointment, bring photo ID and insurance card.     Future Orders Complete By Expires    CBC Auto Differential [[id number] Custom]  11/3/2017 10/4/2018    Comprehensive Metabolic Panel [GRF27 Custom]  11/3/2017 10/4/2018    Flow Cytometry T-Malabar CD4/CD8 Blood [KYI921 Custom]  11/3/2017 10/4/2018    HIV-1 RNA, quantitative, PCR [LPE707 Custom]  11/3/2017 10/4/2018         Information from Your Visit        Department     Name Address Phone Fax [address] [facility] Infectious Disease [address] [facility]  [address]  [address] [phone]      You Were Seen for:         Comments    Human immunodeficiency virus (HIV) disease ([facility]   [[id number]]         Vital Signs     Blood Pressure Temperature Height Weight Last Menstrual Period Body Mass Index    136/60 97.8 °F (36.6 °C) (Oral) 5' 2\" (1.575 m) 108 lb (49 kg) 10/01/2000 19.75 kg/m2    Smoking Status                   Former Smoker              Today's Medication Changes          These changes are accurate as of: 10/4/17 11:57 AM.  If you have any questions, ask your nurse or doctor. START taking these medications           promethazine 12.5 MG tablet   Commonly known as:  PHENERGAN   Instructions: Take 1 tablet by mouth every 6 hours as needed for Nausea   Quantity:  30 tablet   Refills:  5   Started by:  Rosalino Paez MD         CHANGE how you take these medications           mirtazapine 30 MG tablet   Commonly known as:  REMERON   Instructions:  TAKE 1 TABLET BY MOUTH NIGHTLY   Quantity:  30 tablet   Refills:  5   What changed:  See the new instructions.    Changed by:  Rosalino Paez MD            Where to Get Your Medications      These medications were sent to [facility]/pharmacy #[id number]- [address] [phone] Brenda Vides [phone]  [address] [place] [address]     Phone:  [phone]     mirtazapine 30 MG tablet    promethazine 12.5 MG tablet               Your Current Medications Are              mirtazapine (REMERON) 30 MG tablet TAKE 1 TABLET BY MOUTH NIGHTLY    promethazine (PHENERGAN) 12.5 MG tablet Take 1 tablet by mouth every 6 hours as needed for Nausea    sulfamethoxazole-trimethoprim (BACTRIM;SEPTRA) 400-80 MG per tablet TAKE 1 TABLET BY MOUTH DAILY    zidovudine (RETROVIR) 300 MG tablet TAKE 1 TABLET BY MOUTH TWICE A DAY    ritonavir (NORVIR) 100 MG tablet Take 1 tablet by mouth 2 times daily darunavir ethanolate (PREZISTA) 600 MG TABS Take 1 tablet by mouth 2 times daily    TIVICAY 50 MG tablet TAKE 1 TABLET BY MOUTH DAILY    levETIRAcetam (KEPPRA) 500 MG tablet Take 1 tablet by mouth 2 times daily    furosemide (LASIX) 20 MG tablet Take 1 tablet by mouth See Admin Instructions    folic acid (FOLVITE) 1 MG tablet Take 1 tablet by mouth daily    pantoprazole (PROTONIX) 40 MG tablet Take 40 mg by mouth daily    rosuvastatin (CRESTOR) 10 MG tablet TAKE 1 TABLET BY MOUTH NIGHTLY    metoprolol tartrate (LOPRESSOR) 25 MG tablet Take 1 tablet by mouth 2 times daily    clopidogrel (PLAVIX) 75 MG tablet Take 1 tablet by mouth daily    aspirin 81 MG EC tablet Take 81 mg by mouth daily. Allergies              Dapsone Other (See Comments)    methemoglobinemia    Lipitor [Atorvastatin]     Pt experienced muscle cramps in past with Lipitor.  Pt can tolerate Crestor (rosuvastatin)    Eggs Or Egg-derived Products Nausea And Vomiting         Additional Information        Basic Information     Date Of Birth Sex Race Ethnicity Preferred Language    1960 Female White Non-/Non  English      Problem List as of 10/4/2017  Date Reviewed: 7/18/2017                Pneumonia due to Streptococcus pneumoniae ([facility]    Medicare annual wellness visit, subsequent    Complex congenital heart defect    Coronary artery disease involving native coronary artery of native heart without angina pectoris    Chest pain    Human immunodeficiency virus (HIV) disease ([facility]    Essential hypertension    Pure hypercholesterolemia    Ischemic heart disease    S/P coronary angiogram    Acute on chronic diastolic congestive heart failure (HCC)    Diastolic congestive heart failure (HCC)    Chronic kidney disease (CKD), stage III (moderate)    Ventricular septal defect    OLYA III (vulvar intraepithelial neoplasia III)    Moderate dysplasia of cervix    Hypoxia    Pneumonia due to organism    Left shoulder pain URI (upper respiratory infection)    Pharyngitis    Diarrhea    GI bleed    Shingles outbreak    Coronary artery disease due to lipid rich plaque    Other specified glaucoma    Vascular insufficiency of intestine ([facility]    Asthma with exacerbation      Your Goals as of 10/4/2017 at 11:57 AM                 General    Care Coordination Self Management     Notes    CC Self Management Goal  Patient Goal (What steps will patient take to achieve goal?): to stay out of hospital  Patient is able to discuss self-management of condition(s):  Pt demonstrates adherence to medications  Pt demonstrates understanding of self-monitoring  Patient is able to identify Red Flags:  Alert to potential adverse drug reactions(s) or side effects and actions to take should they arise  Discuss target symptoms and actions to take should they arise  Identify problems that require immediate PCP or specialist visit  Patient demonstrates understanding of access to PCP/Specialist:  Understands about scheduling routine Follow Up appointments   Understands about sick day appointment options for worsening of symptoms/progression (Same Day, E Visits)         Lifestyle    Keep weight >100     Notes    Continue to keep weight >100#         Immunizations as of 10/4/2017     Name Date    Hep B Immune Globulin 9/24/2014    Hepatitis A 2/25/2015, 8/27/2014    Hepatitis B 9/24/2014,  Deferred (Patient Refused), 8/27/2014    Hepatitis B (Recombivax HB) 2/25/2015    Influenza Virus Vaccine 11/2/2015, 11/26/2014, 10/13/2013, 10/15/2012, 10/17/2011    Influenza Whole 11/26/2014, 10/6/2009    Influenza, Ctjason Farmer, 3 yrs and older, IM, Preservative Free 11/2/2016    PPD Test 2/26/2014, 10/6/2009    Pneumococcal 13-valent Conjugate ([id number]) 11/26/2014    Pneumococcal Polysaccharide ([id number]) 10/13/2013, 10/11/2005    Td 1/6/2005    Tdap (Boostrix, Adacel) 7/8/2015      Preventive Care           Patient has no pending health maintenance at this time [employer] Signup           Our records indicate that you have an active [employer] account. You can view your After Visit Summary by going to [email or web address] and logging in with your [employer] username and password. If you don't have a [employer] username and password but a parent or guardian has access to your record, the parent or guardian should login with their own [employer] username and password and access your record to view the After Visit Summary. Additional Information  If you have questions, please contact the physician practice where you receive care. Remember, [employer] is NOT to be used for urgent needs. For medical emergencies, dial 911. For questions regarding your [employer] account call [phone]. If you have a clinical question, please call your doctor's office.

## 2017-10-04 NOTE — PROGRESS NOTES
Infectious Diseases HIV Outpatient Note      HIV history:   HIV + 1986, RF -  IVDU / +, h/o CMV retinitis. Transferred to [facility] 2011. Pt had change in HIV meds / HAART on 7/31/13. Primary Care Physician: Ysabel Osorio MD  History Obtained From:  Patient, EPIC    CHIEF COMPLAINT:    Chief Complaint   Patient presents with    Follow-up     HIV       HISTORY OF PRESENT ILLNESS / Interval history:      On 7/31/13. Pt started on new antiretroviral regimen - raltregravir, atazanavir, etravirine this visit. Last visit 2/26 - Pt was doing well, taking meds, no diarrhea. Pt hospitalized 3/7-9 with SVT and hyperkalemia. She was started on O2 (3L continuously). Seen 5/28/14, pt doing well. Changed medication    Seen 8/27/14, pt reports she is ok - 'sad' due to unexpected death of brother this summer. No illness since last visit (other than episodes of SVT, no further hospitalization for this). No diarrhea. Foot rash resolved. Continues on lexapro, dapsone, O2. Seen 11/26/14 --  She had NOT been taking HIV meds. She had GI upset and stopped meds about 3 weeks ago. She knows she should be taking meds. Restarted meds. Seen 3/25/15 -- Pt started and taking HIV meds except etravirine (intelence). She stopped taking this med and diarrhea resolved. Other meds ok. Seen 4/20/15 -- Pt was doing well. Taking meds (current HIV meds - darunavir / r / raltegravir, dapsone). Zidovudine added. Co-receptor assay sent. Seen 5/11/15 -- Pt reported \"a lot going on at home\", mother fell and broke hip, had surgery and was at a nursing home. Seen 6/8/15 -- Pt felt well. Not wearing oxygen (O2 sat 91-2% at home). Changed darunavir / ritonavir to prezicobix  Seen 7/8/15 -- Pt reports she was taking meds [current HIV meds - darunavir/c - prezcobix / raltegravir / zidovudine (last started 4/20), dapsone]. - Energy improved / less fatigue.   She has some trouble sleeping ('jittery'); pt saw cardiology ([address]); Mother home from [address] [facility] and more demented per pt. Seen 8/19/15 -- Pt was taking meds and feeling well. Parents ill. Seen 11/2/15 -- Pt feels good. Taking and tolerating all medication. (0 missed dose since last visit). No complaints. Has occasional diarrhea. Take lomotil and doesn't help. Pt reported that her parents are ill (mother with adv dementia, fall). She is leaving for [place] - daughter had 2nd child. Admitted to [facility] 12/25/15 - 1/13/16. Pt had hypoxia, pneumonia (no dx), cath with RCA stent (Taz Almeida). 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 Transfer / [facility] at [facility] 1/13/16 - 2/6/16. Pt had seizure and L side weakness. Seen by neuro, had w/u - abnormal MRI (L temporoparietal lesion), empiric 10 d course of acyclovir, LP (2/1/16 - normal). Pt had episode of C diff, rx po vancomycin. Attending - Xenia York, spoke to him on phone. Seen 2/11/16 - pt presents with cane, off O2 (left it in car, wears at night). Pt reports she is weak and tired. Living at home, has sister in [facility], has home health care. She still has R weakness, leg > arm. She has sacral PU. She reports she had lost 7 lb. She has HA since LP (2/1/16), back of head, in morning, has to lay down     Seen 3/3/16 - pt felt good - no cane, no O2, gained 4 lb. Coccyx wound now small scab. No further HA. No R side weakness. Brought in meds. Seen 4/14/16 - pr felt good, off O2, gain only 1 lb. Taking med as prescribed, ran out of bactrim and has not taken for 2 days. Seen 6/9/16 - pt feels well, off O2, gained weight. Taking medications, no missed doses. Seen 8/29/16 -  Pt reports 'shingles back'. She has rash on back with pain (bee stings, ache), worse at night. Same location as previous Shingles. Treated with valtrex x 7 days. She has cough and ST. No SOB / HOBSON. Taking medications, no missed doses.      Seen 11/2/16 - Pt feels well.  Rash and cough / ST resolved (see note 8/29). Taking medications, no missed doses. Seen by Dr Tere Horton, saw 'rash' (per pt), did PAP (no detail - no note), rx clobetasol cream to be applied intravaginally    Seen 2/8/17 --  Pt c/o abd pain, difficulty eating and weight loss. Referred to GI. Seen 4/5/17 --  Pt feeling well. Taking and tolerating medications including HIV ART. Seen by GI, Dr Bettina Santoro, had endoscopy and dx with MONICA, ulcers, treated with PPI and has had resolution of abd pain. Eating normally. Seen by GYN (Dr Aurora Burns), referred to GYN surg (Dr Chaparro Covert) and appt rescheduled. No CP (hx CAD), no HA, no focal weakness (hx CVA), no seizure (keppra stopped)    Seen 6/7/17 --  Pt is feeling well. No illness / hospitalization. Taking and tolerating medications including HIV ART. No missed doses. Pt saw Julia Andino / Dr Chaparro Covert, had LEAP and vulvular ablation - 5/4/17, M Bam.  No further GI issue (taking PPI / protonix and avoiding fried food and tomato sauce). No CP, no HA, no seizure. Parents ill - mother had hip fracture and in NH. Father with Alzheimer, living with pt. Hospitalized [facility] 6/9/17 - SOB, acute onset, improved. Seen by Cardiology. Seen 6/29/17 - pt c/o SOB since left hosp approximately 3 weeks ago. Feet \"swell at night\", better in am.  Worse with lying down. Assoc non-productive cough. Assoc pain in R lateral chest with cough. Feels bloated. No fever /chills. Taking and tolerating all meds. Hosp 8/17 - 18 - pt had facial droop and dysarthria after cardiac cath. Symptoms resolved. Seen by Neuro, restarted Keppra. Seen by Zaire Piña, Dr Suni Acosta and will f/u with him to discuss poss surgery. Seen 8/23/17 - pt c/o GI upset with Norvir. Reports no further CP / chest pressure, less cough. Taking meds - reviewed meds. Living with sister in [place].     Today 10/4/17 - Pt c/o GI upset, \"can't eat\", emesis with HIV meds (occurs about 1/2 hr later)   Moved back to ) 60 tablet 3    folic acid (FOLVITE) 1 MG tablet Take 1 tablet by mouth daily 30 tablet 1    pantoprazole (PROTONIX) 40 MG tablet Take 40 mg by mouth daily      rosuvastatin (CRESTOR) 10 MG tablet TAKE 1 TABLET BY MOUTH NIGHTLY 30 tablet 5    metoprolol tartrate (LOPRESSOR) 25 MG tablet Take 1 tablet by mouth 2 times daily 60 tablet 11    clopidogrel (PLAVIX) 75 MG tablet Take 1 tablet by mouth daily 30 tablet 11    aspirin 81 MG EC tablet Take 81 mg by mouth daily. No current facility-administered medications for this visit. Allergies:  Dapsone; Lipitor [atorvastatin]; and Eggs or egg-derived products    Social History:    TOBACCO:    Quit - 2001  ETOH:    None  DRUGS:    None   SEXUAL HISTORY:   Not active  MARITAL STATUS:       OCCUPATION:   Unemployed     Patient currently lives lives with parents (parent with dementia). Sister in [place]. 2 childrens - son / daughter (3 grandchildren). Family History:   No immunodeficiency, TB    REVIEW OF SYSTEMS:    No fever / chills / sweats. No weight loss. No visual change, eye pain, eye discharge. No oral lesion, sore throat, dysphagia. Denies cough / sputum. Denies chest pain, palpitations. Denies n / v / abd pain. No diarrhea. Denies dysuria or change in urinary function. Denies joint swelling or pain. No myalgia, arthralgia. Denies focal weakness, sensory change or other neurologic symptom. No symptoms endocrinopathy. No symptoms hematologic disease. Tolerating anti-retrovirals. PHYSICAL EXAM:    Vitals:    /60  Temp 97.8 °F (36.6 °C) (Oral)   Ht 5' 2\" (1.575 m)  Wt 108 lb (49 kg)  LMP 10/01/2000  BMI 19.75 kg/m2      GENERAL: Thin, ill appearing. No apparent distress.    HEENT: Membranes moist, no conjunctival changes, no oral lesion, wears dentures  NECK:  Supple, no masses  LYMPH: No adenopathy   LUNGS: Clear b/l, no rales, no dullness  CARDIAC: RRR, no murmur appreciated  ABD:  + BS, soft / NT, no masses  EXT:  No edema, no lesions - clubbing  NEURO: No focal neurologic findings  PSYCH: Orientation, sensorium, mood normal    R posterior erythematous eruption, sensitive to touch, no ulcer, no vesicle; no lesions seen laterally or anteriorly    DATA:    11/2012 - CD4 36 (4%), VL 2,390  4/24/13 - CD4 12, VL ?not done; genotype - resistance NRTI except zidovidine, resistance - NNRTI (except etravirine), sensitive PI.  7/31/13 - CD4 11, VL 24,000, Cr 1.4  10/14/13 CD4 64 (5.3%), VL <20  5/28/14 - CD4 95 (8%), VL <20  8/27/14 - CD4 162 (10%), VL <20  3/23/15 - CD4 64 (5%), VL 85 (unable to do genotype)  5/11/15 - CD4 92 (7%), VL 24  8/14/15 - CD4 123 (7%). VL <20  11/18/15 - CD4 123 (7%). VL <20.   3/3/16   - CD4 164 (11%), VL 58  6/9/16   - CD4 169 , VL <20  11/2/16 - CD4 148, VL <20  2/8/17   - CD4 125 (12), VL <20  6/7/17 -   CD4 126 (11), VL <20    IMPRESSION:    # AIDS, dx 1986, h/o CMV retinitis - L eye, low CD4 with NRTI and NNRTI resistance. HAART modified 7/31/13 (raltregravir 400 bid, atazanavir 400 daily, etravirine 200 bid). HIV labs 10/2013 with increase in CD4 and undetectable VL by ultrasensitive assay (CD4 and VL not done in 2/2014 by lab). Again, modified regimen 5/2014 changing atazanavir to darunavir (due to drug interaction with atazanavir and etravirine (ATV increases ETR dose)]. Taking dapsone for Pneumocystis prophylaxis. Not taking azithromycin (for MAC prophylaxis) as last CD4 >50. Pt has been taking HIV meds (darunavir / r / raltegravir / zidovudine)  She had not been able to tolerate etravirine. She needs another active agent; review of last genotype 4/2013, only NRTI - zidovudine (wonder if accurate, may have 'archived resistance mutation), only NNRTI - etravirine (could not tolerate). Added zidovudine 4/20/15. [Other options include maraviroc and enfuvirtide].       # Cardiac:  Congenital non-corrected transposition of great vessels, previously followed at [facility]; 2005.    RECOMMENDATIONS:      1. HAART:  Cont darunavir / cobistat (prezicoxib), dolutegravir (tivcay), zidovudine (cont off etravirine)  2. Cont bactrim ss daily (Pneumocystis prophylaxis)   3. Labs today - CBC c diff, CMP, CD4 / CD8 lymphocyte subset panel, HIV VL   4. CT surgery - scheduled for 10/26/17  5. Increase dose of mirtazapine to 30 mg/d, Keppra 500 mg bid, Rovustatin (generic) 10 mg daily  6. GI f/u - has seen Dr Hawa Miller; ordered phenergan to take 15-30 min prior to HIV meds  7. GYN f/u  8. RHCM: see HIV database above; flu vaccine today 10/4/17, PPD 2/26/14, Pneumovax (polysaccharide vaccine) 10/14/13 / Prevnar (Pneumococcal conjugated vaccine) 11/26/14, completed Hep A / B vaccine series 2/25/14. RPR NR 2/26/2014. Mammogram for 6/9/15, 6/24/15. Tdap - 7/8/15 (Td 2005 - 10 yr ago). PPD 8/29/16.  9. Psychosocial - living with sister, parent (have dementia), grandchild in [place] and in town, diet, MVI. Reviewed meds including HIV meds, discussed adherence. Primary care f/u Clair Bates, Renal f/u Elva Paz), [address] [facility]), CT surgery Prabhjot Quintana, GYN, Eye     Spend over 50 minutes with patient, reviewing record / problems / meds, formulating plan.     Over 50% of visit with pt face to face counseling, educating, coordinating care     Ashley Agosto MD

## 2017-10-18 NOTE — PRE-PROCEDURE INSTRUCTIONS
[address] [employer]                          Date of Procedure 10/26 Time of Procedure 0700    PRIOR TO PROCEDURE DATE:  1. Please follow any guidelines/instructions prior to your procedure as advised by your surgeon. 2. Arrange for someone to drive you home and be with you for the first 24 hours after discharge for your safety after your procedure for which you received sedation. Ensure it is someone we can share information with regarding your discharge. 3. You must contact your surgeon for instructions IF:   You are taking any blood thinners, aspirin, anti-inflammatory or vitamin E.   There is a change in your physical condition such as a cold, fever, rash, cuts, sores or any other infection, especially near your surgical site. 4. Do not drink alcohol the day before or day of your procedure. 5. A Pre-op History and Physical for surgery MUST be completed by your Physician or Urgent Care within 30 days of your procedure date. Please bring a copy with you on the day of your procedure and along with any other testing performed. THE DAY OF YOUR PROCEDURE:  1. Follow instructions for ARRIVAL TIME as DIRECTED BY YOUR SURGEON. If your surgeon does not give you a specific arrival time, please arrive at  0500.    2. Enter the MAIN entrance from [address] [place] [address] and follow the signs to the free [employer] or [employer] parking (offered free of charge 6am-5pm). 3. Enter the Main Entrance of the hospital (do NOT enter from the lower level of the parking garage). Upon entrance, check in with the  at the main desk on your left. If no one is available at the desk, proceed into the [facility] Waiting Room and go through the door directly into the [facility]. There is a Check-in desk ACROSS from Room 5 (marked with a sign hanging from the ceiling).  The phone number for the surgery center is [phone].    4. DO NOT EAT OR DRINK ANYTHING AFTER MIDNIGHT please bring a copy on the day of your procedure. 15.  With your permission, one family member may accompany you while you are being prepared for surgery. Once you are ready, additional family members may join you. HOW WE KEEP YOU SAFE and WORK TO PREVENT SURGICAL SITE INFECTIONS:  1. Health care workers should always check your ID bracelet to verify your name and birth date. You will be asked many times to state your name, date of birth, and allergies. 2. Health care workers should always clean their hands with soap or alcohol gel before providing care to you. It is okay to ask anyone if they cleaned their hands before they touch you. 3. You will be actively involved in verifying the type of procedure you are having and ensuring the correct surgical site. This will be confirmed multiple times prior to your procedure. Do NOT kalina your surgery site UNLESS instructed to by your surgeon. 4. Do not shave or wax for 72 hours prior to procedure near your operative site. Shaving with a razor can irritate your skin and make it easier to develop an infection. On the day of your procedure, any hair that needs to be removed near the surgical site will be clipped by a healthcare worker using a special clippers designed to avoid skin irritation. 5. When you are in the operating room, your surgical site will be cleansed with a special soap, and in most cases, you will be given an antibiotic before the surgery begins. AFTER YOUR PROCEDURE:  1. For comfort and safety, arrange to have someone at home with you for the first 24 hours after discharge. 2. You and your family will be given written instructions about your diet, activity, dressing care, medications, and return visits. 3. Always clean your hands before and after caring for your wound. Do not let your family touch your surgery site without cleaning their hands.    4. Mild nausea, headache, muscle aches, sore throat, or fatigue may occur after anesthesia. Should any of these symptoms become severe, or should you notice any signs of infection, you should call your surgeon. 5. Narcotic pain medications can cause significant constipation. You may want to add a stool softener to your postoperative medication schedule or speak to your surgeon on how best to manage this SIDE EFFECT. SPECIAL INSTRUCTIONS     Thank you for allowing us to care for you. We strive to exceed your expectations in the overall delivery of care and service provided to you and your family. If you need to contact us for any reason, please call us at [phone]    Instructions reviewed and copy given to patient during preadmission testing visit. Hank Abel. 10/18/2017 .12:43 PM      ADDITIONAL EDUCATIONAL INFORMATION REVIEWED / PROVIDED TO YOU AND YOUR FAMILY:  Yes Taking Control of Your Pain   YES  FAQs about Surgical Site Infections  Yes Cardiac Surgery Instructions for AM admission to the hospital  Yes Learning About Preventing Pressure Sores  Yes Cardiac Surgery Preoperative Hibiclens® Bathing Instructions  Yes Your Care after Heart Surgery Binder      Yes Hibiclens® Bathing Instructions   YES  Incentive Spirometer given to patient- PLEASE BRING THIS SPIROMETER BACK WITH YOU ON THE DAY OF YOUR SURGERY

## 2017-10-18 NOTE — ANESTHESIA PRE-OP
present.   Severe tricuspid regurgitation with RVSP estimated at 108 mmHg.   The right ventricle is enlarged and hypertrophied.   Right atrial size is dilated. COMMENTS:        PSH:  has a past surgical history that includes Coronary angioplasty with stent (2001); Colonoscopy (5/2012); Colposcopy; Dilation and curettage of uterus; Cataract removal (Left); Cardiac catheterization (08/17/2017); and Endometrial ablation.     Patient Active Problem List   Diagnosis    [facility] immunodeficiency virus (HIV) disease ([facility]    Coronary artery disease due to lipid rich plaque    Essential hypertension    Pure hypercholesterolemia    Other specified glaucoma    Vascular insufficiency of intestine ([facility]    Asthma with exacerbation    Chest pain    Shingles outbreak    Coronary artery disease involving native coronary artery of native heart without angina pectoris    Complex congenital heart defect    Diarrhea    GI bleed    Pharyngitis    Medicare annual wellness visit, subsequent    URI (upper respiratory infection)    Left shoulder pain    Hypoxia    Pneumonia due to organism    Hypoxemia    Abnormal CXR    Methemoglobinemia    CAD S/P percutaneous coronary angioplasty    Pulmonary hypertension    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    Encounter for palliative care    Acute dyspnea    Rib pain    [facility]    Anxiety    Pneumonia of both lungs due to infectious organism    Pneumonia due to Streptococcus pneumoniae (HCC)    Chronic kidney disease (CKD), stage III (moderate)    Ventricular septal defect    OLYA III (vulvar intraepithelial neoplasia III)    Moderate dysplasia of cervix    Diastolic congestive heart failure (HCC)    Acute on chronic diastolic congestive heart failure (HCC)    S/P coronary angiogram    Ischemic heart disease     PMH:  Past Medical History:   Diagnosis Date    Anemia     CAD